# Patient Record
Sex: FEMALE | Race: WHITE
[De-identification: names, ages, dates, MRNs, and addresses within clinical notes are randomized per-mention and may not be internally consistent; named-entity substitution may affect disease eponyms.]

---

## 2018-06-09 ENCOUNTER — HOSPITAL ENCOUNTER (EMERGENCY)
Dept: HOSPITAL 31 - C.ER | Age: 38
Discharge: HOME | End: 2018-06-09
Payer: MEDICAID

## 2018-06-09 VITALS — HEART RATE: 67 BPM | TEMPERATURE: 99.4 F | SYSTOLIC BLOOD PRESSURE: 101 MMHG | DIASTOLIC BLOOD PRESSURE: 65 MMHG

## 2018-06-09 VITALS — OXYGEN SATURATION: 100 % | RESPIRATION RATE: 18 BRPM

## 2018-06-09 VITALS — BODY MASS INDEX: 25 KG/M2

## 2018-06-09 DIAGNOSIS — R04.0: Primary | ICD-10-CM

## 2018-06-09 LAB
ALBUMIN SERPL-MCNC: 4 G/DL (ref 3.5–5)
ALBUMIN/GLOB SERPL: 1.2 {RATIO} (ref 1–2.1)
ALT SERPL-CCNC: 8 U/L (ref 9–52)
APTT BLD: 32 SECONDS (ref 21–34)
AST SERPL-CCNC: 23 U/L (ref 14–36)
BASOPHILS # BLD AUTO: 0 K/UL (ref 0–0.2)
BASOPHILS NFR BLD: 0.8 % (ref 0–2)
BUN SERPL-MCNC: 11 MG/DL (ref 7–17)
CALCIUM SERPL-MCNC: 8.8 MG/DL (ref 8.6–10.4)
EOSINOPHIL # BLD AUTO: 0 K/UL (ref 0–0.7)
EOSINOPHIL NFR BLD: 0.5 % (ref 0–4)
ERYTHROCYTE [DISTWIDTH] IN BLOOD BY AUTOMATED COUNT: 16.4 % (ref 11.5–14.5)
GFR NON-AFRICAN AMERICAN: > 60
HGB BLD-MCNC: 10.8 G/DL (ref 11–16)
INR PPP: 1
LIPASE: 111 U/L (ref 23–300)
LYMPHOCYTES # BLD AUTO: 1.1 K/UL (ref 1–4.3)
LYMPHOCYTES NFR BLD AUTO: 26.2 % (ref 20–40)
MCH RBC QN AUTO: 26.4 PG (ref 27–31)
MCHC RBC AUTO-ENTMCNC: 32.4 G/DL (ref 33–37)
MCV RBC AUTO: 81.4 FL (ref 81–99)
MONOCYTES # BLD: 0.3 K/UL (ref 0–0.8)
MONOCYTES NFR BLD: 6.7 % (ref 0–10)
NEUTROPHILS # BLD: 2.9 K/UL (ref 1.8–7)
NEUTROPHILS NFR BLD AUTO: 65.8 % (ref 50–75)
NRBC BLD AUTO-RTO: 0 % (ref 0–2)
PLATELET # BLD: 196 K/UL (ref 130–400)
PMV BLD AUTO: 9.9 FL (ref 7.2–11.7)
PROTHROMBIN TIME: 11.3 SECONDS (ref 9.7–12.2)
RBC # BLD AUTO: 4.1 MIL/UL (ref 3.8–5.2)
WBC # BLD AUTO: 4.3 K/UL (ref 4.8–10.8)

## 2018-06-09 NOTE — C.PDOC
History Of Present Illness


38 year old female with no known medical problems presents to the emergency 

department with complaints of frequent, almost daily, episodes of epistaxis for 

the last two weeks. Patient reports that her most recent nosebleed started this 

morning spontaneously. She denies taking blood thinners or any history of 

trauma. 


Time Seen by Provider: 06/09/18 11:13


Chief Complaint (Nursing): ENT Problem


History Per: Patient


History/Exam Limitations: no limitations


Onset/Duration Of Symptoms: Hrs, Sudden Onset


Current Symptoms Are (Timing): Still Present


Location Of Bleeding: Left Nare


Symptoms Have Been: Episodic


Associated Symptoms: denies: Syncope, Lightheadedness, Bleeding From Gums, 

Nasal Congestion





Past Medical History


Reviewed: Historical Data, Nursing Documentation, Vital Signs


Vital Signs: 


 Last Vital Signs











Temp  99.4 F   06/09/18 13:03


 


Pulse  67   06/09/18 13:03


 


Resp  18   06/09/18 13:03


 


BP  101/65   06/09/18 13:03


 


Pulse Ox  100   06/09/18 17:11














- Medical History


PMH: No Chronic Diseases


Surgical History: No Surg Hx


Family History: States: No Known Family Hx





- Social History


Hx Alcohol Use: No


Hx Substance Use: No





- Immunization History


Hx Tetanus Toxoid Vaccination: No


Hx Influenza Vaccination: No


Hx Pneumococcal Vaccination: No





Review Of Systems


Constitutional: Negative for: Fever, Chills


ENT: Positive for: Nose Discharge.  Negative for: Ear Pain, Throat Pain


Gastrointestinal: Negative for: Vomiting


Skin: Negative for: Rash


Neurological: Negative for: Weakness, Numbness





Physical Exam





- Physical Exam


Appears: Well, Non-toxic, No Acute Distress


Skin: Warm, Dry


Head: Atraumatic, Normacephalic


Eye(s): bilateral: Normal Inspection


Ear(s): Bilateral: Normal


Nose: Epistaxis (mild active bleeding from left nare)


Tongue: Normal Appearing, No Bleeding


Lips: Normal Appearing


Teeth: Normal Dentition


Gingiva: No Bleeding


Neck: Supple


Cardiovascular: Rhythm Regular


Respiratory: Normal Breath Sounds, No Rales, No Rhonchi, No Wheezing


Neurological/Psych: Oriented x3, Normal Speech, Normal Cognition





ED Course And Treatment





- Laboratory Results


Result Diagrams: 


 06/09/18 11:46





 06/09/18 11:46


O2 Sat by Pulse Oximetry: 100 (RA)


Pulse Ox Interpretation: Normal


Progress Note: Plan:  CMP.  Lipase.  CBC.  PTT.  Prothrombin Time.  Pressure 

applied to left nare. No visible source of bleeding at this time, will be re-

evaluated.





Medical Decision Making


Medical Decision Making: 





epistaxis resolved shortly after arriving in ed with effective nasal 

compression.  pt has not bled since, and has been in ed for 2 hours.  pt 

advised if bleeding starts again what to do.  labs normal, given to pt to bring 

to specialist on monday at already scheduled appt. 





Disposition


Counseled Patient/Family Regarding: Studies Performed, Diagnosis





- Disposition


Referrals: 


Behin,Babak, MD [Staff Provider] - 


Disposition: HOME/ ROUTINE


Disposition Time: 13:15


Condition: IMPROVED


Additional Instructions: 


Please hold end of nose tightly for 10 monutes at a time if bleeding starts 

again; re-evaluate after 10 minutes; if still bleeding. compress again.  If 

bleeding dioesn't stop iin 20 minutes, return to ER.  Follow up with your 

specialist or with Dr Behin  (ear/nose/throat) doctor lucina Morales. 


Instructions:  Nosebleeds (DC)


Forms:  [employer] (English), General Discharge Instructions





- Clinical Impression


Clinical Impression: 


 Epistaxis








- PA / NP / Resident Statement


MD/DO has reviewed & agrees with the documentation as recorded.





- Scribe Statement


The provider has reviewed the documentation as recorded by the Scribe (Jose Leal)


All medical record entries made by the Scribe were at my direction and 

personally dictated by me. I have reviewed the chart and agree that the record 

accurately reflects my personal performance of the history, physical exam, 

medical decision making, and the department course for this patient. I have 

also personally directed, reviewed, and agree with the discharge instructions 

and disposition.